# Patient Record
Sex: FEMALE | Race: BLACK OR AFRICAN AMERICAN | NOT HISPANIC OR LATINO | Employment: UNEMPLOYED | RURAL
[De-identification: names, ages, dates, MRNs, and addresses within clinical notes are randomized per-mention and may not be internally consistent; named-entity substitution may affect disease eponyms.]

---

## 2024-04-26 ENCOUNTER — HOSPITAL ENCOUNTER (EMERGENCY)
Facility: HOSPITAL | Age: 64
Discharge: HOME | End: 2024-04-27
Attending: EMERGENCY MEDICINE

## 2024-04-26 DIAGNOSIS — S01.01XA LACERATION OF SCALP, INITIAL ENCOUNTER: ICD-10-CM

## 2024-04-26 DIAGNOSIS — F10.929 ALCOHOLIC INTOXICATION WITH COMPLICATION: ICD-10-CM

## 2024-04-26 DIAGNOSIS — S00.03XA CONTUSION OF SCALP, INITIAL ENCOUNTER: ICD-10-CM
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
## 2024-04-26 PROCEDURE — 36415 COLL VENOUS BLD VENIPUNCTURE: CPT | Performed by: EMERGENCY MEDICINE

## 2024-04-26 PROCEDURE — 82077 ASSAY SPEC XCP UR&BREATH IA: CPT | Performed by: EMERGENCY MEDICINE

## 2024-04-26 PROCEDURE — 80053 COMPREHEN METABOLIC PANEL: CPT | Performed by: EMERGENCY MEDICINE

## 2024-04-26 PROCEDURE — 85025 COMPLETE CBC W/AUTO DIFF WBC: CPT | Performed by: EMERGENCY MEDICINE

## 2024-04-26 PROCEDURE — 99284 EMERGENCY DEPT VISIT MOD MDM: CPT | Mod: 25,,, | Performed by: EMERGENCY MEDICINE

## 2024-04-26 PROCEDURE — 12001 RPR S/N/AX/GEN/TRNK 2.5CM/<: CPT | Mod: ,,, | Performed by: EMERGENCY MEDICINE

## 2024-04-26 PROCEDURE — 99285 EMERGENCY DEPT VISIT HI MDM: CPT | Mod: 25

## 2024-04-27 VITALS
SYSTOLIC BLOOD PRESSURE: 102 MMHG | DIASTOLIC BLOOD PRESSURE: 82 MMHG | TEMPERATURE: 98 F | BODY MASS INDEX: 19.29 KG/M2 | HEART RATE: 88 BPM | OXYGEN SATURATION: 99 % | HEIGHT: 66 IN | WEIGHT: 120 LBS | RESPIRATION RATE: 17 BRPM

## 2024-04-27 DIAGNOSIS — S32.511A CLOSED FRACTURE OF SUPERIOR RAMUS OF RIGHT PUBIS, INITIAL ENCOUNTER: Primary | ICD-10-CM

## 2024-04-27 PROBLEM — F10.929 ALCOHOLIC INTOXICATION WITH COMPLICATION: Status: ACTIVE | Noted: 2024-04-27

## 2024-04-27 PROBLEM — S00.03XA CONTUSION OF SCALP: Status: ACTIVE | Noted: 2024-04-27
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
## 2024-04-27 LAB
ALBUMIN SERPL BCP-MCNC: 3.7 G/DL (ref 3.5–5)
ALBUMIN/GLOB SERPL: 0.9 {RATIO}
ALP SERPL-CCNC: 61 U/L (ref 50–130)
ALT SERPL W P-5'-P-CCNC: 61 U/L (ref 13–56)
ANION GAP SERPL CALCULATED.3IONS-SCNC: 14 MMOL/L (ref 7–16)
AST SERPL W P-5'-P-CCNC: 98 U/L (ref 15–37)
BASOPHILS # BLD AUTO: 0.05 K/UL (ref 0–0.2)
BASOPHILS NFR BLD AUTO: 0.7 % (ref 0–1)
BILIRUB SERPL-MCNC: 0.4 MG/DL (ref ?–1.2)
BUN SERPL-MCNC: 6 MG/DL (ref 7–18)
BUN/CREAT SERPL: 11 (ref 6–20)
CALCIUM SERPL-MCNC: 9.3 MG/DL (ref 8.5–10.1)
CHLORIDE SERPL-SCNC: 93 MMOL/L (ref 98–107)
CO2 SERPL-SCNC: 24 MMOL/L (ref 21–32)
CREAT SERPL-MCNC: 0.54 MG/DL (ref 0.55–1.02)
DIFFERENTIAL METHOD BLD: ABNORMAL
EGFR (NO RACE VARIABLE) (RUSH/TITUS): 104 ML/MIN/1.73M2
EOSINOPHIL # BLD AUTO: 0.11 K/UL (ref 0–0.5)
EOSINOPHIL NFR BLD AUTO: 1.5 % (ref 1–4)
EOSINOPHIL NFR BLD MANUAL: 1 % (ref 1–4)
ERYTHROCYTE [DISTWIDTH] IN BLOOD BY AUTOMATED COUNT: 12.3 % (ref 11.5–14.5)
ETHANOL SERPL-MCNC: 277 MG/DL
ETHANOL SERPL-MCNC: 339 MG/DL
GLOBULIN SER-MCNC: 4.2 G/DL (ref 2–4)
GLUCOSE SERPL-MCNC: 93 MG/DL (ref 74–106)
HCT VFR BLD AUTO: 31.9 % (ref 38–47)
HGB BLD-MCNC: 10.9 G/DL (ref 12–16)
IMM GRANULOCYTES # BLD AUTO: 0.03 K/UL (ref 0–0.04)
IMM GRANULOCYTES NFR BLD: 0.4 % (ref 0–0.4)
LYMPHOCYTES # BLD AUTO: 3.01 K/UL (ref 1–4.8)
LYMPHOCYTES NFR BLD AUTO: 41.7 % (ref 27–41)
LYMPHOCYTES NFR BLD MANUAL: 39 % (ref 27–41)
MACROCYTES BLD QL SMEAR: ABNORMAL
MCH RBC QN AUTO: 33.5 PG (ref 27–31)
MCHC RBC AUTO-ENTMCNC: 34.2 G/DL (ref 32–36)
MCV RBC AUTO: 98.2 FL (ref 80–96)
MONOCYTES # BLD AUTO: 1.02 K/UL (ref 0–0.8)
MONOCYTES NFR BLD AUTO: 14.1 % (ref 2–6)
MONOCYTES NFR BLD MANUAL: 13 % (ref 2–6)
MPC BLD CALC-MCNC: 10.7 FL (ref 9.4–12.4)
NEUTROPHILS # BLD AUTO: 2.99 K/UL (ref 1.8–7.7)
NEUTROPHILS NFR BLD AUTO: 41.6 % (ref 53–65)
NEUTS SEG NFR BLD MANUAL: 47 % (ref 50–62)
NRBC # BLD AUTO: 0 X10E3/UL
NRBC, AUTO (.00): 0 %
PLATELET # BLD AUTO: 169 K/UL (ref 150–400)
PLATELET MORPHOLOGY: ABNORMAL
POTASSIUM SERPL-SCNC: 3.6 MMOL/L (ref 3.5–5.1)
PROT SERPL-MCNC: 7.9 G/DL (ref 6.4–8.2)
RBC # BLD AUTO: 3.25 M/UL (ref 4.2–5.4)
SODIUM SERPL-SCNC: 127 MMOL/L (ref 136–145)
WBC # BLD AUTO: 7.21 K/UL (ref 4.5–11)

## 2024-04-27 PROCEDURE — 96365 THER/PROPH/DIAG IV INF INIT: CPT

## 2024-04-27 PROCEDURE — 96366 THER/PROPH/DIAG IV INF ADDON: CPT

## 2024-04-27 PROCEDURE — 25000003 PHARM REV CODE 250: Performed by: EMERGENCY MEDICINE

## 2024-04-27 PROCEDURE — 63600175 PHARM REV CODE 636 W HCPCS: Performed by: EMERGENCY MEDICINE

## 2024-04-27 PROCEDURE — 82077 ASSAY SPEC XCP UR&BREATH IA: CPT | Performed by: EMERGENCY MEDICINE

## 2024-04-27 PROCEDURE — 36415 COLL VENOUS BLD VENIPUNCTURE: CPT | Performed by: EMERGENCY MEDICINE

## 2024-04-27 PROCEDURE — 12001 RPR S/N/AX/GEN/TRNK 2.5CM/<: CPT

## 2024-04-27 RX ADMIN — FOLIC ACID: 5 INJECTION, SOLUTION INTRAMUSCULAR; INTRAVENOUS; SUBCUTANEOUS at 01:04

## 2024-04-27 NOTE — DISCHARGE INSTRUCTIONS
DRINK PLENTY OF NON-ALCOHOLIC FLUIDS.  FOLLOW UP IN 7 DAYS FOR SUTURE REMOVAL.  RETURN TO THE EMERGENCY DEPARTMENT AS NEEDED.

## 2024-04-27 NOTE — ED PROVIDER NOTES
Encounter Date: 4/26/2024    SCRIBE #1 NOTE: I, Amee Nazario, am scribing for, and in the presence of,  Brayden Duffy MD. I have scribed the entire note.       History     Chief Complaint   Patient presents with    Fall     Trip and fall at home tonight. Reports drinking alcohol earlier tonight.      This a 62 y/o female,who presents to the ED via EMS S/P fall. She states she was on the phone with her friend and the next thing she knew EMS was at her house. Per EMS, the pt's sister states she has been drinking and fell. Pt voices she has been drinking tonight. There are no other complaints/pain in the ED at this time. There is no past medical hx on file. There is no surgical hx on file. There is smoking or drinking hx on file.    The history is provided by the patient and the EMS personnel. No  was used.     Review of patient's allergies indicates:  No Known Allergies  No past medical history on file.  No past surgical history on file.  No family history on file.     Review of Systems   HENT:          Laceration noted to the posterior scalp.    All other systems reviewed and are negative.      Physical Exam     Initial Vitals   BP Pulse Resp Temp SpO2   04/27/24 0001 04/26/24 2357 04/26/24 2357 04/26/24 2357 04/26/24 2357   (!) 135/91 98 13 97.9 °F (36.6 °C) 100 %      MAP       --                Physical Exam    Nursing note and vitals reviewed.  Constitutional: She appears well-developed and well-nourished.   HENT:   Head: Normocephalic.   There is a laceration noted to posterior scalp.      Eyes: Conjunctivae and EOM are normal. Pupils are equal, round, and reactive to light.   Neck: Neck supple.   Normal range of motion.  Cardiovascular:  Normal rate, regular rhythm, normal heart sounds and intact distal pulses.           Pulmonary/Chest: Breath sounds normal.   Abdominal: Abdomen is soft. Bowel sounds are normal.   Musculoskeletal:         General: Normal range of motion.       Cervical back: Normal range of motion and neck supple.     Neurological: She is alert and oriented to person, place, and time. She has normal strength.   Skin: Skin is warm and dry. Capillary refill takes less than 2 seconds.   Psychiatric: She has a normal mood and affect. Thought content normal.         ED Course   Lac Repair    Date/Time: 4/27/2024 1:21 AM    Performed by: Brayden Duffy MD  Authorized by: Brayden Duffy MD    Consent:     Consent obtained:  Verbal    Consent given by:  Patient  Universal protocol:     Patient identity confirmed:  Verbally with patient, arm band and provided demographic data  Laceration details:     Location:  Scalp  Pre-procedure details:     Preparation:  Patient was prepped and draped in usual sterile fashion  Skin repair:     Repair method:  Sutures    Suture size:  3-0    Wound skin closure material used: Ethilon.    Suture technique:  Horizontal mattress    Number of sutures:  1    Labs Reviewed   COMPREHENSIVE METABOLIC PANEL - Abnormal; Notable for the following components:       Result Value    Sodium 127 (*)     Chloride 93 (*)     BUN 6 (*)     Creatinine 0.54 (*)     Globulin 4.2 (*)     ALT 61 (*)     AST 98 (*)     All other components within normal limits   ALCOHOL,MEDICAL (ETHANOL) - Abnormal; Notable for the following components:    Ethanol 339 (*)     All other components within normal limits   CBC WITH DIFFERENTIAL - Abnormal; Notable for the following components:    RBC 3.25 (*)     Hemoglobin 10.9 (*)     Hematocrit 31.9 (*)     MCV 98.2 (*)     MCH 33.5 (*)     Neutrophils % 41.6 (*)     Lymphocytes % 41.7 (*)     Monocytes % 14.1 (*)     Monocytes, Absolute 1.02 (*)     All other components within normal limits   MANUAL DIFFERENTIAL - Abnormal; Notable for the following components:    Segmented Neutrophils, Man % 47 (*)     Monocytes, Man % 13 (*)     Platelet Morphology Few Large Platelets (*)     All other components within normal limits    ALCOHOL,MEDICAL (ETHANOL) - Abnormal; Notable for the following components:    Ethanol 277 (*)     All other components within normal limits   CBC W/ AUTO DIFFERENTIAL    Narrative:     The following orders were created for panel order CBC auto differential.  Procedure                               Abnormality         Status                     ---------                               -----------         ------                     CBC with Differential[[id number]]       Abnormal            Final result               Manual Differential[[id number]]         Abnormal            Final result                 Please view results for these tests on the individual orders.   EXTRA TUBES    Narrative:     The following orders were created for panel order EXTRA TUBES.  Procedure                               Abnormality         Status                     ---------                               -----------         ------                     Light Blue Top Hold[[id number]]                             In process                   Please view results for these tests on the individual orders.   LIGHT BLUE TOP HOLD          Imaging Results              X-Ray Hip 2 or 3 views Right (with Pelvis when performed) (Final result)  Result time 04/27/24 08:18:02   Notes recorded by LOLI Brenner on 4/29/2024 at 10:00 AM CDT  S/w pts daughter she will let pt know about the referral to ortho.  Notes recorded by Zakiya Baldwin on 4/27/2024 at 9:04 AM CDT  Please notify the patient that she can weight bear as tolerated, will be contacted with an appointment with ortho and needs to follow up with them     Final result by Jim Wagner II, MD (04/27/24 08:18:02)                   Impression:      Right pubic bone fracture as described above.      Electronically signed by: Jim Wagner  Date:    04/27/2024  Time:    08:18               Narrative:    EXAMINATION:  XR HIP WITH PELVIS WHEN PERFORMED, 2 OR 3  VIEWS RIGHT    CLINICAL  HISTORY:  injury;    COMPARISON:  None available    TECHNIQUE:  XR HIP WITH PELVIS 3  VIEWS RIGHT    FINDINGS:  There is fractures of the superior pubic ramus with slight displacement.  The alignment of the joints appears normal.  No degenerative change is present.  Multiple calcified leiomyomas in the uterus.  No other soft tissue abnormality is seen.                                       CT Head Without Contrast (Final result)  Result time 04/27/24 08:06:18      Final result by Jim Wagner II, MD (04/27/24 08:06:18)                   Impression:      No evidence of acute process or acute injury demonstrated.      Electronically signed by: Jim Wagner  Date:    04/27/2024  Time:    08:06               Narrative:    EXAMINATION:  CT HEAD WITHOUT CONTRAST    CLINICAL HISTORY:  Head trauma, moderate-severe;    TECHNIQUE:  Axial CT imaging of the brain is performed without contrast with 3 mm increments.    CT dose reduction technique used - Dose Rite and tube current modulation.    COMPARISON:  None available    FINDINGS:  No evidence of hemorrhage, mass, mass effect, midline shift or acute infarct seen.  The brain parenchyma attenuation and differentiation appears within normal limits for age. The ventricles and cisterns are normal in caliber.  No cranial or skull base abnormality is identified.                                       Radiology (Final result)  Result time 04/30/24 14:27:35      Final result by Unknown User (04/30/24 14:27:35)                                         Medications   sodium chloride 0.9% 1,000 mL with mvi, (ADULT) no.4 with vit K 3,300 unit- 150 mcg/10 mL 10 mL, thiamine 100 mg, folic acid 1 mg infusion ( Intravenous Stopped 4/27/24 0337)     Medical Decision Making  ALCOHOL INTOXICATION WITH FALL.  SCALP LACERATION    DDX:  INTRACRANIAL INJURY VS EXTERNAL ONLY...  +/- ETOH INTOXICATION.    LACERATION CLOSED IN ED.  PATIENT TO FOLLOW UP OUTPATIENT.      Amount and/or Complexity of  Data Reviewed  Labs: ordered. Decision-making details documented in ED Course.  Radiology: ordered. Decision-making details documented in ED Course.              Attending Attestation:           Physician Attestation for Scribe:  Physician Attestation Statement for Scribe #1: I, Brayden Duffy MD, reviewed documentation, as scribed by Amee Nazario in my presence, and it is both accurate and complete.                                    Clinical Impression:  Final diagnoses:  [W19.XXXA] Fall, initial encounter (Primary)  [S00.03XA] Contusion of scalp, initial encounter  [F10.929] Alcoholic intoxication with complication  [S01.01XA] Laceration of scalp, initial encounter          ED Disposition Condition    Discharge Stable          ED Prescriptions    None       Follow-up Information       Follow up With Specialties Details Why Contact Info [facility] - Emergency Department Emergency Medicine  As needed [address] [place] [id number] [phone]             Brayden Duffy MD  04/27/24 0455       Brayden Duffy MD  06/04/24 7362

## 2024-04-29 ENCOUNTER — TELEPHONE (OUTPATIENT)
Dept: EMERGENCY MEDICINE | Facility: HOSPITAL | Age: 64
End: 2024-04-29

## 2024-04-29 NOTE — TELEPHONE ENCOUNTER
----- Message from FARHAT Cabezas sent at 4/27/2024  9:04 AM CDT -----  Please notify the patient that she can weight bear as tolerated, will be contacted with an appointment with ortho and needs to follow up with them

## 2024-04-30 ENCOUNTER — TELEPHONE (OUTPATIENT)
Dept: ORTHOPEDICS | Facility: CLINIC | Age: 64
End: 2024-04-30

## 2024-04-30 NOTE — TELEPHONE ENCOUNTER
Calling to schedule patient with Dr. Mckenna Thursday, 5/2 at 1:15 for a fracture. Please verify patient is able to come at that time and get a good contact number for the patient.

## 2024-05-01 ENCOUNTER — TELEPHONE (OUTPATIENT)
Dept: ORTHOPEDICS | Facility: CLINIC | Age: 64
End: 2024-05-01

## 2024-05-01 NOTE — TELEPHONE ENCOUNTER
PATIENT SCHEDULED    ----- Message from Zena Chaudhary sent at 5/1/2024  3:15 PM CDT -----  Patient returning a call. She did state she could be here 05/02 at 1:15 to see Dr Mckenna. Call back # [phone]